# Patient Record
Sex: MALE | Race: BLACK OR AFRICAN AMERICAN | NOT HISPANIC OR LATINO | URBAN - METROPOLITAN AREA
[De-identification: names, ages, dates, MRNs, and addresses within clinical notes are randomized per-mention and may not be internally consistent; named-entity substitution may affect disease eponyms.]

---

## 2021-04-26 ENCOUNTER — TRANSCRIBE ORDERS (OUTPATIENT)
Dept: ADMINISTRATIVE | Facility: HOSPITAL | Age: 21
End: 2021-04-26

## 2021-04-26 DIAGNOSIS — S83.203A OTHER TEAR OF MENISCUS OF RIGHT KNEE, UNSPECIFIED MENISCUS, UNSPECIFIED WHETHER OLD OR CURRENT TEAR, INITIAL ENCOUNTER: Primary | ICD-10-CM

## 2021-05-20 ENCOUNTER — HOSPITAL ENCOUNTER (OUTPATIENT)
Dept: MRI IMAGING | Facility: CLINIC | Age: 21
Discharge: HOME | End: 2021-05-20
Payer: COMMERCIAL

## 2021-05-20 DIAGNOSIS — S83.203A OTHER TEAR OF MENISCUS OF RIGHT KNEE, UNSPECIFIED MENISCUS, UNSPECIFIED WHETHER OLD OR CURRENT TEAR, INITIAL ENCOUNTER: ICD-10-CM

## 2021-05-20 PROCEDURE — 73721 MRI JNT OF LWR EXTRE W/O DYE: CPT

## 2023-02-10 ENCOUNTER — HOSPITAL ENCOUNTER (EMERGENCY)
Facility: HOSPITAL | Age: 23
Discharge: HOME | End: 2023-02-10
Attending: EMERGENCY MEDICINE

## 2023-02-10 VITALS
DIASTOLIC BLOOD PRESSURE: 70 MMHG | RESPIRATION RATE: 16 BRPM | TEMPERATURE: 99.3 F | SYSTOLIC BLOOD PRESSURE: 137 MMHG | BODY MASS INDEX: 22.89 KG/M2 | WEIGHT: 155 LBS | HEART RATE: 62 BPM | OXYGEN SATURATION: 96 %

## 2023-02-10 DIAGNOSIS — K08.89 PAIN, DENTAL: Primary | ICD-10-CM

## 2023-02-10 RX ORDER — NAPROXEN 250 MG/1
500 TABLET ORAL ONCE
Status: COMPLETED | OUTPATIENT
Start: 2023-02-10 | End: 2023-02-10

## 2023-02-10 RX ORDER — AMOXICILLIN 250 MG/1
500 CAPSULE ORAL ONCE
Status: COMPLETED | OUTPATIENT
Start: 2023-02-10 | End: 2023-02-10

## 2023-02-10 RX ORDER — OXYCODONE HYDROCHLORIDE AND ACETAMINOPHEN 5; 325 MG/1; MG/1
1 TABLET ORAL EVERY 4 HOURS PRN
Qty: 12 TABLET | Refills: 0 | Status: SHIPPED | OUTPATIENT
Start: 2023-02-10

## 2023-02-10 RX ORDER — AMOXICILLIN 500 MG/1
500 CAPSULE ORAL 3 TIMES DAILY
Qty: 21 CAPSULE | Refills: 0 | Status: SHIPPED | OUTPATIENT
Start: 2023-02-10 | End: 2023-02-17

## 2023-02-10 RX ADMIN — NAPROXEN 500 MG: 250 TABLET ORAL at 19:49

## 2023-02-10 RX ADMIN — AMOXICILLIN 500 MG: 250 CAPSULE ORAL at 19:49

## 2023-02-10 NOTE — Clinical Note
Heather Marin was seen and treated in our emergency department on 2/10/2023  Diagnosis:     Tere Carrillo  may return to work on return date  He may return on this date: 02/11/2023         If you have any questions or concerns, please don't hesitate to call        Niraj Vann MD    ______________________________           _______________          _______________  Hospital Representative                              Date                                Time

## 2023-02-10 NOTE — Clinical Note
Nuriahoracio Javi was seen and treated in our emergency department on 2/10/2023  Diagnosis:     Teresa Murphy  may return to work on return date  He may return on this date: 02/11/2023         If you have any questions or concerns, please don't hesitate to call        Jessica Calvert MD    ______________________________           _______________          _______________  Hospital Representative                              Date                                Time

## 2023-02-10 NOTE — Clinical Note
Barry Olson was seen and treated in our emergency department on 2/10/2023  No restrictions            Diagnosis:     Natalia Dunlap  may return to work on return date  He may return on this date: 02/11/2023         If you have any questions or concerns, please don't hesitate to call        Ever ZULMA Morfin    ______________________________           _______________          _______________  [facility] Representative                              Date                                Time

## 2023-02-11 NOTE — ED PROVIDER NOTES
History  Chief Complaint   Patient presents with   • Dental Pain     Pt c/o tooth infection left side for a couple months  Pt states dentist said needs a root canal  Denies fevers  Pt also c/o ringing in left ear for a week  Pt prescribed abx finished 11/2022  History provided by:  Patient   used: No    Dental Pain  Location:  Lower  Lower teeth location:  18/LL 2nd molar  Quality:  Aching  Severity:  Moderate  Onset quality:  Gradual  Timing:  Constant  Progression:  Worsening  Chronicity:  Recurrent  Previous work-up:  Dental exam  Relieved by:  Nothing  Worsened by:  Nothing  Ineffective treatments:  None tried  Associated symptoms: no facial swelling and no fever        None       History reviewed  No pertinent past medical history  Past Surgical History:   Procedure Laterality Date   • KNEE CARTILAGE SURGERY Right        History reviewed  No pertinent family history  I have reviewed and agree with the history as documented  E-Cigarette/Vaping   • E-Cigarette Use Never User      E-Cigarette/Vaping Substances   • Nicotine No    • THC No    • CBD No    • Flavoring No    • Other No    • Unknown No      Social History     Tobacco Use   • Smoking status: Never   Vaping Use   • Vaping Use: Never used   Substance Use Topics   • Alcohol use: Never   • Drug use: Yes     Types: Marijuana       Review of Systems   Constitutional: Negative for chills and fever  HENT: Positive for dental problem  Negative for facial swelling, sore throat, trouble swallowing and voice change  Eyes: Negative for visual disturbance  Respiratory: Negative for cough and shortness of breath  Cardiovascular: Negative for chest pain and palpitations  Gastrointestinal: Negative for abdominal pain, nausea and vomiting  Genitourinary: Negative for dysuria and hematuria  Musculoskeletal: Negative for arthralgias and back pain  Skin: Negative for color change and rash     Neurological: Negative for seizures and syncope  All other systems reviewed and are negative  Physical Exam  Physical Exam  Vitals and nursing note reviewed  Constitutional:       General: He is not in acute distress  Appearance: He is well-developed  HENT:      Head: Normocephalic and atraumatic  Mouth/Throat:      Mouth: Mucous membranes are moist       Pharynx: Oropharynx is clear  Comments: Gross fracture/decay of tooth #18  No associated fluctuance or sign of drainable abscess  Eyes:      Conjunctiva/sclera: Conjunctivae normal    Cardiovascular:      Rate and Rhythm: Normal rate and regular rhythm  Heart sounds: No murmur heard  Pulmonary:      Effort: Pulmonary effort is normal  No respiratory distress  Breath sounds: Normal breath sounds  Abdominal:      Palpations: Abdomen is soft  Tenderness: There is no abdominal tenderness  Musculoskeletal:         General: No swelling  Cervical back: Neck supple  Skin:     General: Skin is warm and dry  Capillary Refill: Capillary refill takes less than 2 seconds  Neurological:      General: No focal deficit present  Mental Status: He is alert and oriented to person, place, and time     Psychiatric:         Mood and Affect: Mood normal          Vital Signs  ED Triage Vitals   Temperature Pulse Respirations Blood Pressure SpO2   02/10/23 1808 02/10/23 1808 02/10/23 1808 02/10/23 1808 02/10/23 1808   99 3 °F (37 4 °C) 62 16 137/70 96 %      Temp Source Heart Rate Source Patient Position - Orthostatic VS BP Location FiO2 (%)   02/10/23 1808 02/10/23 1808 02/10/23 1808 02/10/23 1808 --   Oral Monitor Sitting Left arm       Pain Score       02/10/23 1949       7           Vitals:    02/10/23 1808   BP: 137/70   Pulse: 62   Patient Position - Orthostatic VS: Sitting         Visual Acuity      ED Medications  Medications   amoxicillin (AMOXIL) capsule 500 mg (500 mg Oral Given 2/10/23 1949)   naproxen (NAPROSYN) tablet 500 mg (500 mg Oral Given 2/10/23 1949)       Diagnostic Studies  Results Reviewed     None                 No orders to display              Procedures  Procedures         ED Course                               SBIRT 20yo+    Flowsheet Row Most Recent Value   SBIRT (23 yo +)    In order to provide better care to our patients, we are screening all of our patients for alcohol and drug use  Would it be okay to ask you these screening questions? Yes Filed at: 02/10/2023 1835   Initial Alcohol Screen: US AUDIT-C     1  How often do you have a drink containing alcohol? 0 Filed at: 02/10/2023 1835   2  How many drinks containing alcohol do you have on a typical day you are drinking? 0 Filed at: 02/10/2023 1835   3a  Male UNDER 65: How often do you have five or more drinks on one occasion? 0 Filed at: 02/10/2023 1835   3b  FEMALE Any Age, or MALE 65+: How often do you have 4 or more drinks on one occassion? 0 Filed at: 02/10/2023 1835   Audit-C Score 0 Filed at: 02/10/2023 9204   GORDON: How many times in the past year have you    Used an illegal drug or used a prescription medication for non-medical reasons? Never Filed at: 02/10/2023 [employer]  60-year-old male with left lower dental pain  Has history of tooth fracture and was on antibiotics, had dental exam sometime in November of last year  His pain improved and the recommended root canal was cost prohibitive for him at the time  Pain returned a few days ago  No fevers, no sign of drainable abscess, ANUG, or shaquille's  Plan to start abx and treat pain  Pt has f/u with dentistry in 3 days  Pain, dental: complicated acute illness or injury  Risk  Prescription drug management            Disposition  Final diagnoses:   Pain, dental     Time reflects when diagnosis was documented in both MDM as applicable and the Disposition within this note     Time User Action Codes Description Comment    2/10/2023  7:30 PM Domingo Johnston Add [K08 89] Pain, dental       ED Disposition     ED Disposition   Discharge    Condition   Stable    Date/Time   Fri Feb 10, 2023 Moisés Merritt discharge to home/self care  Follow-up Information     Follow up With Specialties Details Why [address] MD Pediatrics   [address] [place] [address] [facility]    [address] [id number] [phone]          Discharge Medication List as of 2/10/2023  7:47 PM      START taking these medications    Details   amoxicillin (AMOXIL) 500 mg capsule Take 1 capsule (500 mg total) by mouth 3 (three) times a day for 7 days, Starting Fri 2/10/2023, Until Fri 2/17/2023, Normal      oxyCODONE-acetaminophen (Percocet) 5-325 mg per tablet Take 1 tablet by mouth every 4 (four) hours as needed for moderate pain for up to 12 doses Max Daily Amount: 6 tablets, Starting Fri 2/10/2023, Normal             No discharge procedures on file      PDMP Review       Value Time User    PDMP Reviewed  Yes 2/10/2023  7:46 PM Ghazala Olivares MD          ED Provider  Electronically Signed by           Ghazala Olivares MD  02/10/23 0709

## 2024-12-19 ENCOUNTER — APPOINTMENT (OUTPATIENT)
Dept: URGENT CARE | Facility: CLINIC | Age: 24
End: 2024-12-19
Payer: COMMERCIAL

## 2024-12-19 PROCEDURE — 90656 IIV3 VACC NO PRSV 0.5 ML IM: CPT

## 2024-12-19 PROCEDURE — 90471 IMMUNIZATION ADMIN: CPT
